# Patient Record
Sex: MALE | Race: BLACK OR AFRICAN AMERICAN | Employment: UNEMPLOYED | URBAN - METROPOLITAN AREA
[De-identification: names, ages, dates, MRNs, and addresses within clinical notes are randomized per-mention and may not be internally consistent; named-entity substitution may affect disease eponyms.]

---

## 2024-01-30 VITALS
DIASTOLIC BLOOD PRESSURE: 70 MMHG | BODY MASS INDEX: 20.7 KG/M2 | SYSTOLIC BLOOD PRESSURE: 105 MMHG | TEMPERATURE: 98 F | OXYGEN SATURATION: 100 % | WEIGHT: 170 LBS | RESPIRATION RATE: 18 BRPM | HEIGHT: 76 IN | HEART RATE: 64 BPM

## 2024-01-30 PROCEDURE — 99284 EMERGENCY DEPT VISIT MOD MDM: CPT

## 2024-01-30 ASSESSMENT — PAIN - FUNCTIONAL ASSESSMENT: PAIN_FUNCTIONAL_ASSESSMENT: 0-10

## 2024-01-30 ASSESSMENT — PAIN SCALES - GENERAL: PAINLEVEL_OUTOF10: 8

## 2024-01-31 ENCOUNTER — APPOINTMENT (OUTPATIENT)
Dept: CT IMAGING | Age: 24
End: 2024-01-31

## 2024-01-31 ENCOUNTER — HOSPITAL ENCOUNTER (EMERGENCY)
Age: 24
Discharge: HOME | End: 2024-01-31
Attending: EMERGENCY MEDICINE

## 2024-01-31 DIAGNOSIS — G44.89 OTHER HEADACHE SYNDROME: Primary | ICD-10-CM

## 2024-01-31 LAB
FLUAV RNA SPEC QL NAA+PROBE: NOT DETECTED
FLUBV RNA SPEC QL NAA+PROBE: NOT DETECTED

## 2024-01-31 PROCEDURE — 87502 INFLUENZA DNA AMP PROBE: CPT

## 2024-01-31 PROCEDURE — 70450 CT HEAD/BRAIN W/O DYE: CPT

## 2024-01-31 PROCEDURE — 36600 WITHDRAWAL OF ARTERIAL BLOOD: CPT

## 2024-01-31 RX ORDER — IBUPROFEN 600 MG/1
600 TABLET ORAL
Status: DISCONTINUED | OUTPATIENT
Start: 2024-01-31 | End: 2024-01-31 | Stop reason: HOSPADM

## 2024-01-31 RX ORDER — PROCHLORPERAZINE MALEATE 10 MG
10 TABLET ORAL
Status: DISCONTINUED | OUTPATIENT
Start: 2024-01-31 | End: 2024-01-31 | Stop reason: HOSPADM

## 2024-01-31 RX ORDER — PROCHLORPERAZINE EDISYLATE 5 MG/ML
10 INJECTION INTRAMUSCULAR; INTRAVENOUS
Status: DISCONTINUED | OUTPATIENT
Start: 2024-01-31 | End: 2024-01-31

## 2024-01-31 RX ORDER — KETOROLAC TROMETHAMINE 30 MG/ML
30 INJECTION, SOLUTION INTRAMUSCULAR; INTRAVENOUS
Status: DISCONTINUED | OUTPATIENT
Start: 2024-01-31 | End: 2024-01-31

## 2024-01-31 ASSESSMENT — PAIN SCALES - GENERAL: PAINLEVEL_OUTOF10: 8

## 2024-01-31 ASSESSMENT — PAIN DESCRIPTION - LOCATION: LOCATION: HEAD

## 2024-01-31 NOTE — ED PROVIDER NOTES
Emergency Department Provider Note       PCP: No, Pcp   Age: 23 y.o.   Sex: male     DISPOSITION Decision To Discharge 01/31/2024 02:36:44 AM       ICD-10-CM    1. Other headache syndrome  G44.89           Medical Decision Making     Complexity of Problems Addressed:  Complexity of Problem: 1 acute, uncomplicated illness or injury.    Data Reviewed and Analyzed:  I independently ordered and reviewed each unique test.             Discussion of management or test interpretation.  Head CT is negative for acute process and influenza is negative.  Unable to perform a carboxyhemoglobin although I think this is unlikely as the patient states that headache began before potential exposure.  Headache likely secondary to marijuana use.       Risk of Complications and/or Morbidity of Patient Management:  Shared medical decision making was utilized in creating the patients health plan today.        History      Patient presents to the ER for evaluation with complaint of a severe headache that began suddenly several hours ago.  Pain is described as pressure-like in the bitemporal area.  However no reported fever, or upper respiratory infection symptoms or sinus congestion.  He denies any sore throat, nausea.  He states he has not had a headache like this in the past.  He does admit to marijuana use today.  He also states that he was in the basement of hotel or motel sleep but cannot exactly specify when that occurred although he stated it seemed to have occurred after the headache started.  No reported history of migraines.  He denies any other illicit drug use.    The history is provided by the patient and medical records.        Physical Exam     Vitals signs and nursing note reviewed.   Vitals:    01/30/24 2353   BP: 105/70   Pulse: 64   Resp: 18   Temp: 98 °F (36.7 °C)   TempSrc: Oral   SpO2: 100%   Weight: 77.1 kg (170 lb)   Height: 1.93 m (6' 4\")       Physical Exam  Vitals and nursing note reviewed.   Constitutional:

## 2024-01-31 NOTE — DISCHARGE INSTRUCTIONS
We would love to help you get a primary care doctor for follow-up after your emergency department visit.    Please call [phone] between 7AM - 6PM Monday to Friday.  A care navigator will be able to assist you with setting up a doctor close to your home.         FOR ADDITIONAL COMMUNITY RESOURCE INFORMATION VISIT:    [email or web address]           OR  [email or web address]

## 2024-01-31 NOTE — ED TRIAGE NOTES
Pt arrives via EMS, pt c/o headache and nausea. Pt was sleeping in basement of hotel, pt admits to use of THC tonight.

## 2024-01-31 NOTE — ED NOTES
I have reviewed discharge instructions with the patient.  The patient verbalized understanding.    Patient left ED via Discharge Method: ambulatory to Home.    Opportunity for questions and clarification provided.       Patient given 0 scripts.         To continue your aftercare when you leave the hospital, you may receive an automated call from our care team to check in on how you are doing.  This is a free service and part of our promise to provide the best care and service to meet your aftercare needs.” If you have questions, or wish to unsubscribe from this service please call [phone].  Thank you for Choosing our [facility] Emergency Department.

## 2024-07-16 ENCOUNTER — HOSPITAL ENCOUNTER (EMERGENCY)
Age: 24
Discharge: HOME | End: 2024-07-17
Attending: STUDENT IN AN ORGANIZED HEALTH CARE EDUCATION/TRAINING PROGRAM
Payer: MEDICAID

## 2024-07-16 DIAGNOSIS — F10.920 ACUTE ALCOHOLIC INTOXICATION WITHOUT COMPLICATION (HCC): Primary | ICD-10-CM

## 2024-07-16 PROCEDURE — 99283 EMERGENCY DEPT VISIT LOW MDM: CPT

## 2024-07-16 ASSESSMENT — PAIN - FUNCTIONAL ASSESSMENT: PAIN_FUNCTIONAL_ASSESSMENT: NONE - DENIES PAIN

## 2024-07-17 VITALS
DIASTOLIC BLOOD PRESSURE: 62 MMHG | SYSTOLIC BLOOD PRESSURE: 108 MMHG | WEIGHT: 170 LBS | RESPIRATION RATE: 16 BRPM | HEIGHT: 76 IN | OXYGEN SATURATION: 97 % | BODY MASS INDEX: 20.7 KG/M2 | TEMPERATURE: 98.2 F | HEART RATE: 73 BPM

## 2024-07-17 ASSESSMENT — LIFESTYLE VARIABLES
HOW OFTEN DO YOU HAVE A DRINK CONTAINING ALCOHOL: 2-4 TIMES A MONTH
HOW MANY STANDARD DRINKS CONTAINING ALCOHOL DO YOU HAVE ON A TYPICAL DAY: 3 OR 4

## 2024-07-17 NOTE — ED NOTES
Patient mobility status  with no difficulty. Provider aware     I have reviewed discharge instructions with the patient.  The patient verbalized understanding.    Patient left ED via Discharge Method: ambulatory to Home with Extended Family:.    Opportunity for questions and clarification provided.     Patient given 0 scripts.           Stephanie Monge RN  07/17/24 0654     Home PT

## 2024-07-17 NOTE — ED PROVIDER NOTES
[facility]  Emergency Department    DISPOSITION Decision To Discharge 07/17/2024 06:11:26 AM       ICD-10-CM    1. Acute alcoholic intoxication without complication (HCC)  F10.920         ED Course     ED Course as of 07/17/24 0611   Wed Jul 17, 2024   0059 23-year-old male presents with alcohol intoxication.  He has no complaints.  .  Vitals reassuring.  No external evidence of trauma.  Will observe in the ER until clinically sober [ER]   0610 Patient observed in the ER for 6.5 hours.  He is now much more awake and is clinically sober.  He is able to ambulate without difficulty.  He feels safe at home.  He feels comfortable with discharge home. [ER]      ED Course User Index  [ER] Lan Henry MD     Data Reviewed and Analyzed:  1 acute, uncomplicated illness or injury.    I independently ordered and reviewed each unique test.   The patients assessment required an independent historian: EMS.  The reason they were needed is  an altered level of consciousness.       JOHN Dubose is a 23 y.o. male with a history of none who presents to the ED with complaint of alcohol intoxication.  His history obtained via EMS due to patient's alcohol intoxication.  Called out by bystanders due to patient being found on the sidewalk apparently and felt intoxicated.  He has no complaints at this time.  Denies any injury or trauma.  No areas of pain.    History   No past medical history on file.  No past surgical history on file.  No family history on file.  No Known Allergies    Physical Exam     Vitals:    07/17/24 0507 07/17/24 0512 07/17/24 0517 07/17/24 0522   BP:   108/62    Pulse:       Resp:       Temp:       TempSrc:       SpO2: 100% 100% 99% 97%   Weight:       Height:         Nursing note and vitals reviewed.    Constitutional: Well developed, NAD; sleepy but arouses to voice and painful stimuli.  Smells of EtOH  HEENT: Atraumatic, conjugate gaze, EOM intact  Neck:

## 2024-07-17 NOTE — ED TRIAGE NOTES
Patient arrives via EMS with alcohol intoxication. Ems was called out to a business downtown where patient was sitting at a table outside. Patient had vomited once. Patient complaining of nausea. Patient alert and oriented upon arrival. EMS

## 2024-07-17 NOTE — DISCHARGE INSTRUCTIONS
[facility] [place]   [phone]   They have multiple resources to help you.  Please call.  [email or web address]     Other local resources that are available are:       [facility]    [phone]  [email or web address] for inpatient and outpatient substance abuse issues.    [facility] [phone]  Medication assisted treatment    [facility]  [phone]     Suicide Hotline   9-950-ZXBRKOO     Narcotics Anonymous   www.na.org  Alcoholics Anonymous  www.aa.org